# Patient Record
Sex: MALE | Race: WHITE | NOT HISPANIC OR LATINO | Employment: FULL TIME | URBAN - NONMETROPOLITAN AREA
[De-identification: names, ages, dates, MRNs, and addresses within clinical notes are randomized per-mention and may not be internally consistent; named-entity substitution may affect disease eponyms.]

---

## 2020-12-03 ENCOUNTER — OFFICE VISIT (OUTPATIENT)
Dept: ORTHOPEDIC SURGERY | Facility: CLINIC | Age: 20
End: 2020-12-03

## 2020-12-03 VITALS — WEIGHT: 116 LBS | RESPIRATION RATE: 16 BRPM | BODY MASS INDEX: 19.81 KG/M2 | HEIGHT: 64 IN

## 2020-12-03 DIAGNOSIS — M25.521 ARTHRALGIA OF ELBOW, RIGHT: Primary | ICD-10-CM

## 2020-12-03 DIAGNOSIS — M77.8 TRICEPS TENDONITIS: ICD-10-CM

## 2020-12-03 PROCEDURE — 99203 OFFICE O/P NEW LOW 30 MIN: CPT | Performed by: PHYSICIAN ASSISTANT

## 2020-12-03 NOTE — PROGRESS NOTES
Subjective   Patient ID: Navin Méndez is a 20 y.o. right hand dominant male  Initial Evaluation of the Right Elbow (Referred here by [facility] for right elbow pain. Was seen there 11/28/20 with imaging. Denies injury, became painful 1 week ago.)         History of Present Illness  Patient presents as a new patient at the request of urgent care for right elbow pain.  Patient denies having any injury or trauma.  He states on 11/28/2020 he developed significant right elbow pain.  There was no redness or warmth.  He did notice some swelling.  He denies numbness or tingling.  Denies shoulder pain. denies wrist pain  Patient went to the urgent care where he had x-rays.  He was told he may have posttraumatic injuries to the elbow and was provided Medrol Dosepak.  He states after taking the Medrol Dosepak he has had no pain.  He is feeling much better currently    Pain Score: 1  Pain Location: Elbow  Pain Orientation: Right     Pain Descriptors: Aching  Pain Frequency: Intermittent                               Past Medical History:   Diagnosis Date   • Hypoglycemia         Past Surgical History:   Procedure Laterality Date   • NO PAST SURGERIES         Family History   Problem Relation Age of Onset   • No Known Problems Mother    • No Known Problems Father        Social History     Socioeconomic History   • Marital status: Single     Spouse name: Not on file   • Number of children: Not on file   • Years of education: Not on file   • Highest education level: Not on file   Occupational History   • Occupation: stock     Employer: [employer]   Tobacco Use   • Smoking status: Never Smoker   • Smokeless tobacco: Never Used   Substance and Sexual Activity   • Alcohol use: Never     Frequency: Never   • Drug use: Never   • Sexual activity: Defer         Current Outpatient Medications:   •  methylPREDNISolone (MEDROL) 4 MG dose pack, Take as directed on package instructions., Disp: 1 each, Rfl:  "0    Allergies   Allergen Reactions   • Penicillins Unknown - Low Severity     ALL CILLINS       Review of Systems   Constitutional: Negative for diaphoresis, fever and unexpected weight change.   HENT: Negative for dental problem and sore throat.    Eyes: Negative for visual disturbance.   Respiratory: Negative for shortness of breath.    Cardiovascular: Negative for chest pain.   Gastrointestinal: Negative for abdominal pain, constipation, diarrhea, nausea and vomiting.   Genitourinary: Negative for difficulty urinating and frequency.   Musculoskeletal: Positive for arthralgias.   Neurological: Negative for headaches.   Hematological: Does not bruise/bleed easily.   All other systems reviewed and are negative.      I have reviewed the medical and surgical history, family history, social history, medications, and/or allergies, and the review of systems of this report.    Objective   Resp 16   Ht 162.6 cm (64\")   Wt 52.6 kg (116 lb)   BMI 19.91 kg/m²    Physical Exam  Vitals signs and nursing note reviewed.   Constitutional:       General: He is not in acute distress.     Appearance: Normal appearance.   Pulmonary:      Effort: Pulmonary effort is normal. No respiratory distress.   Musculoskeletal:      Right shoulder: He exhibits no deformity, no laceration and no pain.      Right elbow: He exhibits normal range of motion, no swelling, no effusion and no deformity. No tenderness found. No radial head, no medial epicondyle, no lateral epicondyle and no olecranon process tenderness noted.      Right wrist: He exhibits normal range of motion, no tenderness, no bony tenderness, no swelling, no effusion, no crepitus, no deformity and no laceration.      Right hand: He exhibits normal capillary refill and no swelling. Normal sensation noted.   Neurological:      General: No focal deficit present.      Mental Status: He is alert and oriented to person, place, and time.   Psychiatric:         Mood and Affect: Mood " normal.       Right Elbow Exam     Range of Motion   The patient has normal right elbow ROM.    Muscle Strength   The patient has normal right elbow strength.    Tests   Varus: negative  Valgus: negative  Tinel's sign (cubital tunnel): negative    Other   Erythema: absent  Sensation: normal  Pulse: present             Neurologic Exam     Mental Status   Oriented to person, place, and time.          There is no sulcus to the right triceps tendon    Assessment/Plan   Independent Review of Radiographic Studies:    FINAL REPORT     CLINICAL HISTORY:  Right elbow pain     FINDINGS:  2 views of the right elbow were obtained.  There is no acute  fracture or dislocation.  There is a linear bony density  adjacent to the olecranon suspect posttraumatic.  There is no  joint effusion.  The joint spaces are intact.  There is no acute  soft tissue abnormalities.     IMPRESSION:  Possible posttraumatic change without acute bony abnormality.     Authenticated by Whitney Wright MD on 11/28/2020 03:43:24 PM      We reviewed the x-ray results and imaging.  He denies having any past history of injury to the elbow    Procedures       Diagnoses and all orders for this visit:    1. Arthralgia of elbow, right (Primary)    2. Triceps tendonitis       Regular exercise as tolerated  Orthopedic activities reviewed and patient expressed appreciation  Discussion of orthopedic goals  Risk, benefits, and merits of treatment alternatives reviewed with the patient and questions answered    Recommendations/Plan:  Exercise, medications, injections, other patient advice, and return appointment as noted.  Patient is encouraged to call or return for any issues or concerns.    Because the patient is feeling better without pain.  His exam is within normal limits I do not feel an MRI or therapy is needed.  He will contact the office should any new symptoms or worsening symptoms occur    Patient agreeable to call or return sooner for any concerns.                RHINA Dragon-transcription disclaimer:  This encounter note is an electronic transcription of spoken language to printed text.  Electronic transcription of spoken language may permit erroneous or at times nonsensical words or phrases to be inadvertently transcribed.  Although I have reviewed the note for such errors, some may still exist

## 2021-11-22 ENCOUNTER — APPOINTMENT (OUTPATIENT)
Dept: GENERAL RADIOLOGY | Facility: HOSPITAL | Age: 21
End: 2021-11-22

## 2021-11-22 ENCOUNTER — HOSPITAL ENCOUNTER (OUTPATIENT)
Facility: HOSPITAL | Age: 21
Setting detail: OBSERVATION
Discharge: HOME | End: 2021-11-23
Attending: EMERGENCY MEDICINE | Admitting: STUDENT IN AN ORGANIZED HEALTH CARE EDUCATION/TRAINING PROGRAM

## 2021-11-22 DIAGNOSIS — I40.9 ACUTE MYOCARDITIS, UNSPECIFIED MYOCARDITIS TYPE: Primary | ICD-10-CM

## 2021-11-22 LAB
ALBUMIN SERPL-MCNC: 4.6 G/DL (ref 3.5–5.2)
ALBUMIN/GLOB SERPL: 1.4 G/DL
ALP SERPL-CCNC: 72 U/L (ref 39–117)
ALT SERPL W P-5'-P-CCNC: 17 U/L (ref 1–41)
ANION GAP SERPL CALCULATED.3IONS-SCNC: 10.5 MMOL/L (ref 5–15)
AST SERPL-CCNC: 36 U/L (ref 1–40)
BASOPHILS # BLD AUTO: 0.05 10*3/MM3 (ref 0–0.2)
BASOPHILS NFR BLD AUTO: 0.7 % (ref 0–1.5)
BILIRUB SERPL-MCNC: 0.4 MG/DL (ref 0–1.2)
BUN SERPL-MCNC: 12 MG/DL (ref 6–20)
BUN/CREAT SERPL: 14.1 (ref 7–25)
CALCIUM SPEC-SCNC: 9.3 MG/DL (ref 8.6–10.5)
CHLORIDE SERPL-SCNC: 100 MMOL/L (ref 98–107)
CO2 SERPL-SCNC: 24.5 MMOL/L (ref 22–29)
CREAT SERPL-MCNC: 0.85 MG/DL (ref 0.76–1.27)
DEPRECATED RDW RBC AUTO: 39.1 FL (ref 37–54)
EOSINOPHIL # BLD AUTO: 0.22 10*3/MM3 (ref 0–0.4)
EOSINOPHIL NFR BLD AUTO: 3 % (ref 0.3–6.2)
ERYTHROCYTE [DISTWIDTH] IN BLOOD BY AUTOMATED COUNT: 12.1 % (ref 12.3–15.4)
GFR SERPL CREATININE-BSD FRML MDRD: 112 ML/MIN/1.73
GLOBULIN UR ELPH-MCNC: 3.2 GM/DL
GLUCOSE SERPL-MCNC: 180 MG/DL (ref 65–99)
HCT VFR BLD AUTO: 43.5 % (ref 37.5–51)
HGB BLD-MCNC: 15 G/DL (ref 13–17.7)
HOLD SPECIMEN: NORMAL
HOLD SPECIMEN: NORMAL
IMM GRANULOCYTES # BLD AUTO: 0.02 10*3/MM3 (ref 0–0.05)
IMM GRANULOCYTES NFR BLD AUTO: 0.3 % (ref 0–0.5)
LYMPHOCYTES # BLD AUTO: 2.05 10*3/MM3 (ref 0.7–3.1)
LYMPHOCYTES NFR BLD AUTO: 27.9 % (ref 19.6–45.3)
MCH RBC QN AUTO: 30.4 PG (ref 26.6–33)
MCHC RBC AUTO-ENTMCNC: 34.5 G/DL (ref 31.5–35.7)
MCV RBC AUTO: 88.2 FL (ref 79–97)
MONOCYTES # BLD AUTO: 1.02 10*3/MM3 (ref 0.1–0.9)
MONOCYTES NFR BLD AUTO: 13.9 % (ref 5–12)
NEUTROPHILS NFR BLD AUTO: 4 10*3/MM3 (ref 1.7–7)
NEUTROPHILS NFR BLD AUTO: 54.2 % (ref 42.7–76)
NRBC BLD AUTO-RTO: 0 /100 WBC (ref 0–0.2)
PLATELET # BLD AUTO: 196 10*3/MM3 (ref 140–450)
PMV BLD AUTO: 9.6 FL (ref 6–12)
POTASSIUM SERPL-SCNC: 3.5 MMOL/L (ref 3.5–5.2)
PROT SERPL-MCNC: 7.8 G/DL (ref 6–8.5)
RBC # BLD AUTO: 4.93 10*6/MM3 (ref 4.14–5.8)
SODIUM SERPL-SCNC: 135 MMOL/L (ref 136–145)
TROPONIN T SERPL-MCNC: 0.44 NG/ML (ref 0–0.03)
WBC NRBC COR # BLD: 7.36 10*3/MM3 (ref 3.4–10.8)
WHOLE BLOOD HOLD SPECIMEN: NORMAL
WHOLE BLOOD HOLD SPECIMEN: NORMAL

## 2021-11-22 PROCEDURE — 25010000002 ENOXAPARIN PER 10 MG: Performed by: STUDENT IN AN ORGANIZED HEALTH CARE EDUCATION/TRAINING PROGRAM

## 2021-11-22 PROCEDURE — 71045 X-RAY EXAM CHEST 1 VIEW: CPT

## 2021-11-22 PROCEDURE — G0378 HOSPITAL OBSERVATION PER HR: HCPCS

## 2021-11-22 PROCEDURE — 96372 THER/PROPH/DIAG INJ SC/IM: CPT

## 2021-11-22 PROCEDURE — 25010000002 KETOROLAC TROMETHAMINE PER 15 MG: Performed by: EMERGENCY MEDICINE

## 2021-11-22 PROCEDURE — 96374 THER/PROPH/DIAG INJ IV PUSH: CPT

## 2021-11-22 PROCEDURE — 99284 EMERGENCY DEPT VISIT MOD MDM: CPT

## 2021-11-22 PROCEDURE — 36415 COLL VENOUS BLD VENIPUNCTURE: CPT

## 2021-11-22 PROCEDURE — 85025 COMPLETE CBC W/AUTO DIFF WBC: CPT | Performed by: EMERGENCY MEDICINE

## 2021-11-22 PROCEDURE — 84484 ASSAY OF TROPONIN QUANT: CPT | Performed by: EMERGENCY MEDICINE

## 2021-11-22 PROCEDURE — 93005 ELECTROCARDIOGRAM TRACING: CPT | Performed by: EMERGENCY MEDICINE

## 2021-11-22 PROCEDURE — 99220 PR INITIAL OBSERVATION CARE/DAY 70 MINUTES: CPT | Performed by: STUDENT IN AN ORGANIZED HEALTH CARE EDUCATION/TRAINING PROGRAM

## 2021-11-22 PROCEDURE — 80053 COMPREHEN METABOLIC PANEL: CPT | Performed by: EMERGENCY MEDICINE

## 2021-11-22 RX ORDER — KETOROLAC TROMETHAMINE 30 MG/ML
15 INJECTION, SOLUTION INTRAMUSCULAR; INTRAVENOUS ONCE
Status: COMPLETED | OUTPATIENT
Start: 2021-11-22 | End: 2021-11-22

## 2021-11-22 RX ORDER — ACETAMINOPHEN 325 MG/1
650 TABLET ORAL EVERY 6 HOURS PRN
COMMUNITY
End: 2021-11-23 | Stop reason: HOSPADM

## 2021-11-22 RX ORDER — ACETAMINOPHEN 650 MG/1
650 SUPPOSITORY RECTAL EVERY 4 HOURS PRN
Status: DISCONTINUED | OUTPATIENT
Start: 2021-11-22 | End: 2021-11-23 | Stop reason: HOSPADM

## 2021-11-22 RX ORDER — SODIUM CHLORIDE 0.9 % (FLUSH) 0.9 %
10 SYRINGE (ML) INJECTION AS NEEDED
Status: DISCONTINUED | OUTPATIENT
Start: 2021-11-22 | End: 2021-11-23 | Stop reason: HOSPADM

## 2021-11-22 RX ORDER — ACETAMINOPHEN 325 MG/1
650 TABLET ORAL EVERY 4 HOURS PRN
Status: DISCONTINUED | OUTPATIENT
Start: 2021-11-22 | End: 2021-11-23 | Stop reason: HOSPADM

## 2021-11-22 RX ORDER — PANTOPRAZOLE SODIUM 40 MG/1
40 TABLET, DELAYED RELEASE ORAL
Status: DISCONTINUED | OUTPATIENT
Start: 2021-11-23 | End: 2021-11-23 | Stop reason: HOSPADM

## 2021-11-22 RX ORDER — ONDANSETRON 2 MG/ML
4 INJECTION INTRAMUSCULAR; INTRAVENOUS EVERY 6 HOURS PRN
Status: DISCONTINUED | OUTPATIENT
Start: 2021-11-22 | End: 2021-11-23 | Stop reason: HOSPADM

## 2021-11-22 RX ORDER — COLCHICINE 0.6 MG/1
0.6 TABLET ORAL DAILY
Status: DISCONTINUED | OUTPATIENT
Start: 2021-11-22 | End: 2021-11-23 | Stop reason: HOSPADM

## 2021-11-22 RX ORDER — LORAZEPAM 0.5 MG/1
0.5 TABLET ORAL EVERY 8 HOURS PRN
Status: DISCONTINUED | OUTPATIENT
Start: 2021-11-22 | End: 2021-11-23 | Stop reason: HOSPADM

## 2021-11-22 RX ORDER — SODIUM CHLORIDE 0.9 % (FLUSH) 0.9 %
10 SYRINGE (ML) INJECTION EVERY 12 HOURS SCHEDULED
Status: DISCONTINUED | OUTPATIENT
Start: 2021-11-22 | End: 2021-11-23 | Stop reason: HOSPADM

## 2021-11-22 RX ORDER — ACETAMINOPHEN 160 MG/5ML
650 SOLUTION ORAL EVERY 4 HOURS PRN
Status: DISCONTINUED | OUTPATIENT
Start: 2021-11-22 | End: 2021-11-23 | Stop reason: HOSPADM

## 2021-11-22 RX ORDER — IBUPROFEN 800 MG/1
800 TABLET ORAL 3 TIMES DAILY
Status: DISCONTINUED | OUTPATIENT
Start: 2021-11-22 | End: 2021-11-23 | Stop reason: HOSPADM

## 2021-11-22 RX ORDER — ASPIRIN 325 MG
325 TABLET ORAL ONCE
Status: COMPLETED | OUTPATIENT
Start: 2021-11-22 | End: 2021-11-22

## 2021-11-22 RX ADMIN — IBUPROFEN 800 MG: 800 TABLET ORAL at 21:43

## 2021-11-22 RX ADMIN — IBUPROFEN 800 MG: 800 TABLET ORAL at 17:01

## 2021-11-22 RX ADMIN — ASPIRIN 487 MG: 81 TABLET, COATED ORAL at 21:43

## 2021-11-22 RX ADMIN — KETOROLAC TROMETHAMINE 15 MG: 30 INJECTION, SOLUTION INTRAMUSCULAR; INTRAVENOUS at 08:26

## 2021-11-22 RX ADMIN — SODIUM CHLORIDE, PRESERVATIVE FREE 10 ML: 5 INJECTION INTRAVENOUS at 21:43

## 2021-11-22 RX ADMIN — ASPIRIN 325 MG ORAL TABLET 325 MG: 325 PILL ORAL at 08:26

## 2021-11-22 RX ADMIN — ENOXAPARIN SODIUM 40 MG: 40 INJECTION SUBCUTANEOUS at 17:01

## 2021-11-22 RX ADMIN — COLCHICINE 0.6 MG: 0.6 TABLET, FILM COATED ORAL at 17:01

## 2021-11-22 NOTE — ED NOTES
Pt upstairs via wheel chair with orderly. Pt is aaox4 with out ss of distress. Pt has his belongings with him.      Goltz, Patrick, DIONNE  11/22/21 3931

## 2021-11-22 NOTE — H&P
[facility] HOSPITALIST   HISTORY AND PHYSICAL      Name:  Navin Méndez   Age:  21 y.o.  Sex:  male  :  2000  MRN:  [id number]   Visit Number:  [id number]  Admission Date:  2021  Date Of Service:  21  Primary Care Physician:  Provider, No Known    Chief Complaint:     Chest pain    History Of Presenting Illness:      Patient is a pleasant, 21-year-old male with past medical history of hypoglycemia and COVID-19 infection on 21 that presents to the hospital with complaints of chest pain. He explains that he received his first Moderna COVID vaccination on 21 and had some body aches and chills for about 24 hours, but then was in his normal state of health until last night around 11pm. He explains that beginning about 11:00 last night he started having an ache in the center of his chest, that radiated down his left arm. The left arm then began to have tingling sensations, and both resolved with the administration of 1,000mg Acetaminophen. Patient then went to sleep and awoke around 4:00 this morning with a stabbing, throbbing chest pain. He explains that this pain was made a little bit better after a second dosing of 1,000mg Acetaminophen. With continued chest pain and anxiety, the patient felt he should be seen in the ED for further evaluation.    Upon arrival to the ED, patient was found to have blood pressure of 135/102, troponin 0.438, EKG with twave inversions and nonspecific ST elevations. EKG and patient's case was reviewed with Dr. Root by the ED physician and he recommended echocardiogram and NSAID administration either inpatient or outpatient. Given patient's severe anxiety, hospitalist was called for admission for further workup.    Review Of Systems:    All systems were reviewed and negative except as mentioned in history of presenting illness, assessment and plan.    Past Medical History: Patient  has a past medical history of Hypoglycemia and Occasional  tremors.    Past Surgical History: Patient  has a past surgical history that includes No past surgeries.    Social History: Patient  reports that he has never smoked. He has never used smokeless tobacco. He reports current alcohol use. He reports that he does not use drugs.    Family History: Patient's family history includes No Known Problems in his father and mother.    Allergies:      Penicillins    Home Medications:    Prior to Admission Medications     Prescriptions Last Dose Informant Patient Reported? Taking?    albuterol sulfate  (90 Base) MCG/ACT inhaler   No No    Inhale 2 puffs Every 4 (Four) Hours As Needed for Wheezing.    predniSONE (DELTASONE) 20 MG tablet   No No    3 po daily for 3 days, 2 po daily for 3 days, 1 po daily for 3 days        ED Medications:    Medications   sodium chloride 0.9 % flush 10 mL (has no administration in time range)   aspirin tablet 325 mg (325 mg Oral Given 11/22/21 0826)   ketorolac (TORADOL) injection 15 mg (15 mg Intravenous Given 11/22/21 0826)     Vital Signs:  Temp:  [98.4 °F (36.9 °C)] 98.4 °F (36.9 °C)  Heart Rate:  [72-82] 73  Resp:  [20] 20  BP: (123-135)/() 123/91        11/22/21  0747   Weight: 51.7 kg (114 lb)     Body mass index is 19.57 kg/m².    Physical Exam:     General Appearance:  Alert and cooperative. Anxious.   Head:  Atraumatic and normocephalic.   Eyes: Conjunctivae and sclerae normal, no icterus. No pallor.       Throat: No oral lesions, no thrush, oral mucosa moist.   Neck: Supple, trachea midline, no thyromegaly.       Lungs:   Breath sounds heard bilaterally equally.  No crackles or wheezing. No Pleural rub or bronchial breathing.   Heart:  Normal S1 and S2, no murmur, no gallop, no rub. No JVD.   Abdomen:   Normal bowel sounds, no masses, no organomegaly. Soft, nontender, nondistended, no rebound tenderness.   Extremities: Supple, no edema, no cyanosis, no clubbing.   Pulses: Pulses palpable bilaterally.   Skin: No bleeding or  rash.   Neurologic: Alert and oriented x 3. No facial asymmetry. Moves all four limbs. Anxious, tremulous.      Laboratory data:    I have reviewed the labs done in the emergency room.    Results from last 7 days   Lab Units 11/22/21  0752   SODIUM mmol/L 135*   POTASSIUM mmol/L 3.5   CHLORIDE mmol/L 100   CO2 mmol/L 24.5   BUN mg/dL 12   CREATININE mg/dL 0.85   CALCIUM mg/dL 9.3   BILIRUBIN mg/dL 0.4   ALK PHOS U/L 72   ALT (SGPT) U/L 17   AST (SGOT) U/L 36   GLUCOSE mg/dL 180*     Results from last 7 days   Lab Units 11/22/21  0752   WBC 10*3/mm3 7.36   HEMOGLOBIN g/dL 15.0   HEMATOCRIT % 43.5   PLATELETS 10*3/mm3 196         Results from last 7 days   Lab Units 11/22/21  0752   TROPONIN T ng/mL 0.438*                           Invalid input(s): USDES,  BLOODU, NITRITITE, BACT, EP    Pain Management Panel    There is no flowsheet data to display.         EKG:      Sinus rhythm at a rate of 77 bpm, QTc 382, twave inversion in V1. Nonspecific ST elevation in lateral leads.    Radiology:    XR Chest 1 View    Result Date: 11/22/2021  PROCEDURE: XR CHEST 1 VW-  HISTORY: Chest Pain Triage Protocol  COMPARISON: None.  FINDINGS: The heart is normal in size. The mediastinum is unremarkable. The lungs are clear. There is no pneumothorax.  There are no acute osseous abnormalities.      No acute cardiopulmonary process.  Continued followup is recommended.  This report was finalized on 11/22/2021 8:16 AM by Ellie Lundberg M.D..      Assessment:    1.  Acute Pericarditis, POA  2.  Anxiety  3.  History of COVID-19 Infection, 8/20/21    Plan:    - Patient with acute pericarditis and subsequent chest pain and elevated troponin  - Will admit to telemetry on observation  - Received Aspirin 324mg in the ED as well as Ketoralac 15mg  - Will begin Aspirin 500mg tid, Ibuprofen 800mg tid, and Colchicine 0.6mg daily  - Begin PPI while taking high dose NSAID  - prn Ativan 0.5mg   - Will observe overnight and if does well, likely  discharge home in the AM    Risk Assessment: Moderate  DVT Prophylaxis: Lovenox  Code Status: Full  Diet: Cardiac    Advance Care Planning   ACP discussion was held with the patient during this visit. Patient does not have an advance directive, declines further assistance.      Neema Briggs DO  11/22/21  09:27 EST    Dictated utilizing Dragon dictation.

## 2021-11-22 NOTE — ED PROVIDER NOTES
Subjective   21-year-old male presenting with chest pain.  He states that last night started having a dull ache to his left chest, pain radiated to the left arm.  This morning woke up and pain was more severe and sharp.  There are no particular alleviating or aggravating factors.  He denies any nausea, vomiting, cough, shortness of breath.  He does note that 3 days ago he received his COVID-19 vaccine, first dose of Motrin.  He does not smoke.  No family history of early coronary disease or sudden cardiac death.  No other complaints or concerns this morning.          Review of Systems   Constitutional: Negative.    HENT: Negative.    Eyes: Negative.    Respiratory: Negative.    Cardiovascular: Positive for chest pain.   Gastrointestinal: Negative.    Genitourinary: Negative.    Musculoskeletal: Negative.    Skin: Negative.    Neurological: Negative.    Psychiatric/Behavioral: Negative.        Past Medical History:   Diagnosis Date   • Hypoglycemia    • Occasional tremors        Allergies   Allergen Reactions   • Penicillins Unknown - Low Severity     ALL CILLINS       Past Surgical History:   Procedure Laterality Date   • NO PAST SURGERIES         Family History   Problem Relation Age of Onset   • No Known Problems Mother    • No Known Problems Father        Social History     Socioeconomic History   • Marital status: Single   Tobacco Use   • Smoking status: Never Smoker   • Smokeless tobacco: Never Used   Vaping Use   • Vaping Use: Some days   • Substances: CBD   Substance and Sexual Activity   • Alcohol use: Yes     Comment: occ.   • Drug use: Never   • Sexual activity: Defer           Objective   Physical Exam  Vitals reviewed.   Constitutional:       General: He is not in acute distress.     Appearance: Normal appearance. He is not ill-appearing, toxic-appearing or diaphoretic.   HENT:      Head: Normocephalic and atraumatic.      Right Ear: External ear normal.      Left Ear: External ear normal.      Nose:  Nose normal.      Mouth/Throat:      Mouth: Mucous membranes are moist.      Pharynx: Oropharynx is clear.   Eyes:      Extraocular Movements: Extraocular movements intact.      Conjunctiva/sclera: Conjunctivae normal.      Pupils: Pupils are equal, round, and reactive to light.   Cardiovascular:      Rate and Rhythm: Normal rate and regular rhythm.      Pulses: Normal pulses.      Heart sounds: Normal heart sounds.   Pulmonary:      Effort: Pulmonary effort is normal. No respiratory distress.      Breath sounds: Normal breath sounds.   Abdominal:      General: Bowel sounds are normal. There is no distension.      Tenderness: There is no abdominal tenderness.   Musculoskeletal:         General: No swelling, tenderness or deformity. Normal range of motion.      Cervical back: Normal range of motion and neck supple.   Skin:     General: Skin is warm and dry.      Capillary Refill: Capillary refill takes less than 2 seconds.      Findings: No rash.   Neurological:      General: No focal deficit present.      Mental Status: He is alert and oriented to person, place, and time.   Psychiatric:         Mood and Affect: Mood normal.         Behavior: Behavior normal.         Procedures           ED Course                                           MDM  Number of Diagnoses or Management Options  Acute myocarditis, unspecified myocarditis type  Diagnosis management comments: 21-year-old male with chest pain.  Well-developed, well-nourished, nontoxic young man in no distress with exam as above.  His vital signs are normal.  His exam is otherwise nonfocal.  Will obtain labs, EKG and chest x-ray.  Will provide symptomatic treatment.  Disposition pending.    DDx: Anxiety, injection reaction, myocarditis, pericarditis    EKG interpreted by me: Sinus rhythm, normal rate, some non specific ST elevations and HI depressions, this is an abnormal EKG    Initial troponin is fairly elevated at 0.4.  Patient is resting comfortably.   Discussed case with Dr. Root, advised NSAIDs and echo, could be done as inpatient or outpatient.  After discussion with patient we decided inpatient would be the best route.  Discussed with Dr. Friedman who graciously accepts patient for admission.      Final diagnoses:   Acute myocarditis, unspecified myocarditis type          Lobo Weems MD  11/22/21 0925

## 2021-11-23 ENCOUNTER — APPOINTMENT (OUTPATIENT)
Dept: CARDIOLOGY | Facility: HOSPITAL | Age: 21
End: 2021-11-23

## 2021-11-23 VITALS
RESPIRATION RATE: 17 BRPM | OXYGEN SATURATION: 99 % | SYSTOLIC BLOOD PRESSURE: 117 MMHG | TEMPERATURE: 97.3 F | DIASTOLIC BLOOD PRESSURE: 72 MMHG | BODY MASS INDEX: 19.01 KG/M2 | WEIGHT: 111.33 LBS | HEART RATE: 77 BPM | HEIGHT: 64 IN

## 2021-11-23 LAB
ANION GAP SERPL CALCULATED.3IONS-SCNC: 8.1 MMOL/L (ref 5–15)
BASOPHILS # BLD AUTO: 0.04 10*3/MM3 (ref 0–0.2)
BASOPHILS NFR BLD AUTO: 0.6 % (ref 0–1.5)
BH CV ECHO MEAS - % IVS THICK: 27.8 %
BH CV ECHO MEAS - % LVPW THICK: 40.8 %
BH CV ECHO MEAS - AO MAX PG (FULL): 0.47 MMHG
BH CV ECHO MEAS - AO MAX PG: 3 MMHG
BH CV ECHO MEAS - AO MEAN PG (FULL): 1 MMHG
BH CV ECHO MEAS - AO MEAN PG: 2 MMHG
BH CV ECHO MEAS - AO V2 MAX: 92.8 CM/SEC
BH CV ECHO MEAS - AO V2 MEAN: 68.9 CM/SEC
BH CV ECHO MEAS - AO V2 VTI: 17.4 CM
BH CV ECHO MEAS - AVA(I,A): 3.1 CM^2
BH CV ECHO MEAS - AVA(I,D): 3.1 CM^2
BH CV ECHO MEAS - AVA(V,A): 3 CM^2
BH CV ECHO MEAS - AVA(V,D): 3 CM^2
BH CV ECHO MEAS - BSA(HAYCOCK): 1.5 M^2
BH CV ECHO MEAS - BSA: 1.5 M^2
BH CV ECHO MEAS - BZI_BMI: 19.1 KILOGRAMS/M^2
BH CV ECHO MEAS - BZI_METRIC_HEIGHT: 162.6 CM
BH CV ECHO MEAS - BZI_METRIC_WEIGHT: 50.3 KG
BH CV ECHO MEAS - EDV(CUBED): 94.8 ML
BH CV ECHO MEAS - EDV(MOD-SP2): 59.6 ML
BH CV ECHO MEAS - EDV(MOD-SP4): 71.7 ML
BH CV ECHO MEAS - EDV(TEICH): 95.4 ML
BH CV ECHO MEAS - EF(CUBED): 68.3 %
BH CV ECHO MEAS - EF(MOD-BP): 61.2 %
BH CV ECHO MEAS - EF(MOD-SP2): 61.1 %
BH CV ECHO MEAS - EF(MOD-SP4): 60.1 %
BH CV ECHO MEAS - EF(TEICH): 59.9 %
BH CV ECHO MEAS - ESV(CUBED): 30.1 ML
BH CV ECHO MEAS - ESV(MOD-SP2): 23.2 ML
BH CV ECHO MEAS - ESV(MOD-SP4): 28.6 ML
BH CV ECHO MEAS - ESV(TEICH): 38.2 ML
BH CV ECHO MEAS - FS: 31.8 %
BH CV ECHO MEAS - IVS/LVPW: 0.81
BH CV ECHO MEAS - IVSD: 0.79 CM
BH CV ECHO MEAS - IVSS: 1 CM
BH CV ECHO MEAS - LA DIMENSION: 3 CM
BH CV ECHO MEAS - LAD MAJOR: 4.1 CM
BH CV ECHO MEAS - LAT PEAK E' VEL: 14.6 CM/SEC
BH CV ECHO MEAS - LATERAL E/E' RATIO: 4.2
BH CV ECHO MEAS - LV DIASTOLIC VOL/BSA (35-75): 47.1 ML/M^2
BH CV ECHO MEAS - LV MASS(C)D: 132.7 GRAMS
BH CV ECHO MEAS - LV MASS(C)DI: 87.2 GRAMS/M^2
BH CV ECHO MEAS - LV MASS(C)S: 114 GRAMS
BH CV ECHO MEAS - LV MASS(C)SI: 74.9 GRAMS/M^2
BH CV ECHO MEAS - LV MAX PG: 2.5 MMHG
BH CV ECHO MEAS - LV MEAN PG: 1 MMHG
BH CV ECHO MEAS - LV SYSTOLIC VOL/BSA (12-30): 18.8 ML/M^2
BH CV ECHO MEAS - LV V1 MAX: 79.5 CM/SEC
BH CV ECHO MEAS - LV V1 MEAN: 50.6 CM/SEC
BH CV ECHO MEAS - LV V1 VTI: 15.5 CM
BH CV ECHO MEAS - LVIDD: 4.6 CM
BH CV ECHO MEAS - LVIDS: 3.1 CM
BH CV ECHO MEAS - LVLD AP2: 6.4 CM
BH CV ECHO MEAS - LVLD AP4: 6.9 CM
BH CV ECHO MEAS - LVLS AP2: 4.9 CM
BH CV ECHO MEAS - LVLS AP4: 5.2 CM
BH CV ECHO MEAS - LVOT AREA (M): 3.5 CM^2
BH CV ECHO MEAS - LVOT AREA: 3.5 CM^2
BH CV ECHO MEAS - LVOT DIAM: 2.1 CM
BH CV ECHO MEAS - LVPWD: 0.98 CM
BH CV ECHO MEAS - LVPWS: 1.4 CM
BH CV ECHO MEAS - MED PEAK E' VEL: 11.6 CM/SEC
BH CV ECHO MEAS - MEDIAL E/E' RATIO: 5.3
BH CV ECHO MEAS - MV A MAX VEL: 56 CM/SEC
BH CV ECHO MEAS - MV DEC TIME: 0.16 SEC
BH CV ECHO MEAS - MV E MAX VEL: 61.2 CM/SEC
BH CV ECHO MEAS - MV E/A: 1.1
BH CV ECHO MEAS - MV MAX PG: 2.1 MMHG
BH CV ECHO MEAS - MV MEAN PG: 1 MMHG
BH CV ECHO MEAS - MV V2 MAX: 73.2 CM/SEC
BH CV ECHO MEAS - MV V2 MEAN: 45.9 CM/SEC
BH CV ECHO MEAS - MV V2 VTI: 14 CM
BH CV ECHO MEAS - MVA(VTI): 3.9 CM^2
BH CV ECHO MEAS - PA ACC TIME: 0.15 SEC
BH CV ECHO MEAS - PA MAX PG (FULL): 3.4 MMHG
BH CV ECHO MEAS - PA MAX PG: 5.4 MMHG
BH CV ECHO MEAS - PA MEAN PG (FULL): 2 MMHG
BH CV ECHO MEAS - PA MEAN PG: 3 MMHG
BH CV ECHO MEAS - PA PR(ACCEL): 12.4 MMHG
BH CV ECHO MEAS - PA V2 MAX: 116 CM/SEC
BH CV ECHO MEAS - PA V2 MEAN: 77.6 CM/SEC
BH CV ECHO MEAS - PA V2 VTI: 22.8 CM
BH CV ECHO MEAS - PI END-D VEL: 80.1 CM/SEC
BH CV ECHO MEAS - RAP SYSTOLE: 3 MMHG
BH CV ECHO MEAS - RV MAX PG: 2 MMHG
BH CV ECHO MEAS - RV MEAN PG: 1 MMHG
BH CV ECHO MEAS - RV V1 MAX: 70.9 CM/SEC
BH CV ECHO MEAS - RV V1 MEAN: 44.3 CM/SEC
BH CV ECHO MEAS - RV V1 VTI: 13.2 CM
BH CV ECHO MEAS - RVSP: 13 MMHG
BH CV ECHO MEAS - SI(CUBED): 42.5 ML/M^2
BH CV ECHO MEAS - SI(LVOT): 35.9 ML/M^2
BH CV ECHO MEAS - SI(MOD-SP2): 23.9 ML/M^2
BH CV ECHO MEAS - SI(MOD-SP4): 28.3 ML/M^2
BH CV ECHO MEAS - SI(TEICH): 37.5 ML/M^2
BH CV ECHO MEAS - SV(CUBED): 64.7 ML
BH CV ECHO MEAS - SV(LVOT): 54.7 ML
BH CV ECHO MEAS - SV(MOD-SP2): 36.4 ML
BH CV ECHO MEAS - SV(MOD-SP4): 43.1 ML
BH CV ECHO MEAS - SV(TEICH): 57.1 ML
BH CV ECHO MEAS - TAPSE (>1.6): 2.5 CM
BH CV ECHO MEAS - TR MAX PG: 10 MMHG
BH CV ECHO MEAS - TR MAX VEL: 155 CM/SEC
BH CV ECHO MEASUREMENTS AVERAGE E/E' RATIO: 4.67
BH CV XLRA - TDI S': 13.2 CM/SEC
BUN SERPL-MCNC: 18 MG/DL (ref 6–20)
BUN/CREAT SERPL: 20.2 (ref 7–25)
CALCIUM SPEC-SCNC: 9.4 MG/DL (ref 8.6–10.5)
CHLORIDE SERPL-SCNC: 104 MMOL/L (ref 98–107)
CO2 SERPL-SCNC: 27.9 MMOL/L (ref 22–29)
CREAT SERPL-MCNC: 0.89 MG/DL (ref 0.76–1.27)
DEPRECATED RDW RBC AUTO: 40 FL (ref 37–54)
EOSINOPHIL # BLD AUTO: 0.27 10*3/MM3 (ref 0–0.4)
EOSINOPHIL NFR BLD AUTO: 4.3 % (ref 0.3–6.2)
ERYTHROCYTE [DISTWIDTH] IN BLOOD BY AUTOMATED COUNT: 12.3 % (ref 12.3–15.4)
GFR SERPL CREATININE-BSD FRML MDRD: 106 ML/MIN/1.73
GLUCOSE SERPL-MCNC: 163 MG/DL (ref 65–99)
HCT VFR BLD AUTO: 41.6 % (ref 37.5–51)
HGB BLD-MCNC: 14.2 G/DL (ref 13–17.7)
IMM GRANULOCYTES # BLD AUTO: 0.01 10*3/MM3 (ref 0–0.05)
IMM GRANULOCYTES NFR BLD AUTO: 0.2 % (ref 0–0.5)
LEFT ATRIUM VOLUME INDEX: 16 ML/M^2
LEFT ATRIUM VOLUME: 24.3 ML
LV EF 2D ECHO EST: 65 %
LYMPHOCYTES # BLD AUTO: 1.78 10*3/MM3 (ref 0.7–3.1)
LYMPHOCYTES NFR BLD AUTO: 28.1 % (ref 19.6–45.3)
MAXIMAL PREDICTED HEART RATE: 197 BPM
MCH RBC QN AUTO: 30.4 PG (ref 26.6–33)
MCHC RBC AUTO-ENTMCNC: 34.1 G/DL (ref 31.5–35.7)
MCV RBC AUTO: 89.1 FL (ref 79–97)
MONOCYTES # BLD AUTO: 0.63 10*3/MM3 (ref 0.1–0.9)
MONOCYTES NFR BLD AUTO: 9.9 % (ref 5–12)
NEUTROPHILS NFR BLD AUTO: 3.61 10*3/MM3 (ref 1.7–7)
NEUTROPHILS NFR BLD AUTO: 56.9 % (ref 42.7–76)
NRBC BLD AUTO-RTO: 0 /100 WBC (ref 0–0.2)
PLATELET # BLD AUTO: 212 10*3/MM3 (ref 140–450)
PMV BLD AUTO: 10.2 FL (ref 6–12)
POTASSIUM SERPL-SCNC: 4 MMOL/L (ref 3.5–5.2)
RBC # BLD AUTO: 4.67 10*6/MM3 (ref 4.14–5.8)
SODIUM SERPL-SCNC: 140 MMOL/L (ref 136–145)
STRESS TARGET HR: 167 BPM
WBC NRBC COR # BLD: 6.34 10*3/MM3 (ref 3.4–10.8)

## 2021-11-23 PROCEDURE — 99217 PR OBSERVATION CARE DISCHARGE MANAGEMENT: CPT | Performed by: STUDENT IN AN ORGANIZED HEALTH CARE EDUCATION/TRAINING PROGRAM

## 2021-11-23 PROCEDURE — G0378 HOSPITAL OBSERVATION PER HR: HCPCS

## 2021-11-23 PROCEDURE — 80048 BASIC METABOLIC PNL TOTAL CA: CPT | Performed by: STUDENT IN AN ORGANIZED HEALTH CARE EDUCATION/TRAINING PROGRAM

## 2021-11-23 PROCEDURE — 93306 TTE W/DOPPLER COMPLETE: CPT | Performed by: INTERNAL MEDICINE

## 2021-11-23 PROCEDURE — 85025 COMPLETE CBC W/AUTO DIFF WBC: CPT | Performed by: STUDENT IN AN ORGANIZED HEALTH CARE EDUCATION/TRAINING PROGRAM

## 2021-11-23 PROCEDURE — 93306 TTE W/DOPPLER COMPLETE: CPT

## 2021-11-23 RX ORDER — COLCHICINE 0.6 MG/1
0.6 TABLET ORAL DAILY
Qty: 6 TABLET | Refills: 0 | Status: SHIPPED | OUTPATIENT
Start: 2021-11-24 | End: 2021-12-02

## 2021-11-23 RX ORDER — PANTOPRAZOLE SODIUM 40 MG/1
40 TABLET, DELAYED RELEASE ORAL
Qty: 14 TABLET | Refills: 0 | Status: SHIPPED | OUTPATIENT
Start: 2021-11-24 | End: 2022-06-21

## 2021-11-23 RX ORDER — IBUPROFEN 800 MG/1
800 TABLET ORAL 3 TIMES DAILY
Qty: 18 TABLET | Refills: 0 | Status: SHIPPED | OUTPATIENT
Start: 2021-11-23 | End: 2021-12-02

## 2021-11-23 RX ADMIN — ASPIRIN 487 MG: 81 TABLET, COATED ORAL at 08:42

## 2021-11-23 RX ADMIN — COLCHICINE 0.6 MG: 0.6 TABLET, FILM COATED ORAL at 08:43

## 2021-11-23 RX ADMIN — IBUPROFEN 800 MG: 800 TABLET ORAL at 08:43

## 2021-11-23 RX ADMIN — SODIUM CHLORIDE, PRESERVATIVE FREE 10 ML: 5 INJECTION INTRAVENOUS at 08:43

## 2021-11-23 RX ADMIN — PANTOPRAZOLE SODIUM 40 MG: 40 TABLET, DELAYED RELEASE ORAL at 05:55

## 2021-11-23 NOTE — CASE MANAGEMENT/SOCIAL WORK
Continued Stay Note   [facility]     Patient Name: Navin Méndez  MRN: [id number]  Today's Date: 11/23/2021    Admit Date: 11/22/2021     Discharge Plan     Row Name 11/23/21 1008       Plan    Plan Spoke to pt regarding discharge plans .Confirmed address and phone number He does not have insurance sent E mail to [facility] med assist He does not have a Primary He has agreed to [facility] since he does not have insurance .He also has agreed to [facility] for his medication .He lives with his father and his father will transport home               Discharge Codes    No documentation.                     Kiki Alford RN

## 2021-11-23 NOTE — DISCHARGE SUMMARY
[facility]      Name:  Navin Méndez   Age:  21 y.o.  Sex:  male  :  2000  MRN:  [id number]   Visit Number:  [id number]    Admission Date:  2021  Date of Discharge:  2021  Primary Care Physician:  Provider, No Known    Important issues to note:    - Take Colchicine daily and Ibuprofen three times daily for 1 week  - If symptoms persist or worsen, please see your PCP or return to the hospital    Discharge Diagnoses:     1.  Acute Pericarditis, POA  2.  Anxiety  3.  History of COVID-19 Infection, 21    Problem List:     Active Hospital Problems    Diagnosis  POA   • Acute myocarditis [I40.9]  Yes      Resolved Hospital Problems   No resolved problems to display.     Presenting Problem:    Chief Complaint   Patient presents with   • Chest Pain      Consults:     Consulting Physician(s)             None          Procedures Performed:    Echocardiogram    History of presenting illness/Hospital Course:    Patient is a pleasant, 21-year-old male with past medical history of hypoglycemia and COVID-19 infection on 21 that presents to the hospital with complaints of chest pain. He explains that he received his first Moderna COVID vaccination on 21 and had some body aches and chills for about 24 hours, but then was in his normal state of health until last night around 11pm. He explains that beginning about 11:00 last night he started having an ache in the center of his chest, that radiated down his left arm. The left arm then began to have tingling sensations, and both resolved with the administration of 1,000mg Acetaminophen. Patient then went to sleep and awoke around 4:00 this morning with a stabbing, throbbing chest pain. He explains that this pain was made a little bit better after a second dosing of 1,000mg Acetaminophen. With continued chest pain and anxiety, the patient felt he should be seen in the ED for further  evaluation.     Upon arrival to the ED, patient was found to have blood pressure of 135/102, troponin 0.438, EKG with twave inversions and nonspecific ST elevations. EKG and patient's case was reviewed with Dr. Root by the ED physician and he recommended echocardiogram and NSAID administration either inpatient or outpatient. Given patient's severe anxiety, hospitalist was called for admission for further workup.    With administration of NSAIDs and colchicine, patient had complete resolution of symptoms. Echocardiogram was performed with official report pending at time of discharge. Patient discharged home with prescription for NSAID and Colchicine, as well as PPI for gastric protection while taking NSAID. He has been instructed to follow up with his PCP in 1 week and if he does not have resolution of symptoms or worsening of symptoms, then to return to the hospital for evaluation.    Vital Signs:    Temp:  [97.3 °F (36.3 °C)-98.3 °F (36.8 °C)] 97.3 °F (36.3 °C)  Heart Rate:  [56-83] 77  Resp:  [17-20] 17  BP: (100-119)/(66-93) 117/72    Physical Exam:    General Appearance:  Alert and cooperative. In NAD.   Head:  Atraumatic and normocephalic.   Eyes: Conjunctivae and sclerae normal, no icterus. No pallor.       Throat: No oral lesions, no thrush, oral mucosa moist.   Neck: Supple, trachea midline, no thyromegaly.       Lungs:   Breath sounds heard bilaterally equally.  No crackles or wheezing. No Pleural rub or bronchial breathing.   Heart:  Normal S1 and S2, no murmur, no gallop, no rub. No JVD.   Abdomen:   Normal bowel sounds, no masses, no organomegaly. Soft, nontender, nondistended, no rebound tenderness.   Extremities: Supple, no edema, no cyanosis, no clubbing.   Pulses: Pulses palpable bilaterally.   Skin: No bleeding or rash.   Neurologic: Alert and oriented x 3. No facial asymmetry. Moves all four limbs. No tremors.     Pertinent Lab Results:     Results from last 7 days   Lab Units 11/23/21  0657  11/22/21  0752   SODIUM mmol/L 140 135*   POTASSIUM mmol/L 4.0 3.5   CHLORIDE mmol/L 104 100   CO2 mmol/L 27.9 24.5   BUN mg/dL 18 12   CREATININE mg/dL 0.89 0.85   CALCIUM mg/dL 9.4 9.3   BILIRUBIN mg/dL  --  0.4   ALK PHOS U/L  --  72   ALT (SGPT) U/L  --  17   AST (SGOT) U/L  --  36   GLUCOSE mg/dL 163* 180*     Results from last 7 days   Lab Units 11/23/21  0657 11/22/21 0752   WBC 10*3/mm3 6.34 7.36   HEMOGLOBIN g/dL 14.2 15.0   HEMATOCRIT % 41.6 43.5   PLATELETS 10*3/mm3 212 196         Results from last 7 days   Lab Units 11/22/21 0752   TROPONIN T ng/mL 0.438*                           Pertinent Radiology Results:    Imaging Results (All)     Procedure Component Value Units Date/Time    XR Chest 1 View [[id number]] Collected: 11/22/21 0816     Updated: 11/22/21 0818    Narrative:      PROCEDURE: XR CHEST 1 VW-     HISTORY: Chest Pain Triage Protocol     COMPARISON: None.     FINDINGS: The heart is normal in size. The mediastinum is unremarkable.  The lungs are clear. There is no pneumothorax.  There are no acute  osseous abnormalities.       Impression:      No acute cardiopulmonary process.     Continued followup is recommended.     This report was finalized on 11/22/2021 8:16 AM by Ellie Lundberg M.D..          Echo:    Final report pending    Condition on Discharge:      Stable.    Code status during the hospital stay:    Code Status and Medical Interventions:   Ordered at: 11/22/21 0919     Code Status (Patient has no pulse and is not breathing):    CPR (Attempt to Resuscitate)     Medical Interventions (Patient has pulse or is breathing):    Full Support     Discharge Disposition:    Home or Self Care    Discharge Medications:       Discharge Medications      New Medications      Instructions Start Date   colchicine 0.6 MG tablet   0.6 mg, Oral, Daily   Start Date: November 24, 2021     ibuprofen 800 MG tablet  Commonly known as: ADVIL,MOTRIN   800 mg, Oral, 3 Times Daily      pantoprazole 40 MG  EC tablet  Commonly known as: PROTONIX   40 mg, Oral, Every Early Morning   Start Date: November 24, 2021        Stop These Medications    acetaminophen 325 MG tablet  Commonly known as: TYLENOL          Discharge Diet:     As tolerated    Activity at Discharge:     As tolerated    Follow-up Appointments:     Follow-up Information     [facility] Follow up.    Why: new patient see above  Contact information:  [address] [place] [address]  [phone]           Tara Jeronimo APRN Follow up.    Specialty: Family Medicine  Why: @11:30am 30 mins early med bottles with him will assist with sliding scale  Contact information:  [address] [place] [address]  [phone]             Provider, No Known .    Contact information:  [facility] [address]                       No future appointments.  Test Results Pending at Discharge:           Neema Briggs DO  11/23/21  11:16 EST    Time: I spent 35 minutes on this discharge activity which included: face-to-face encounter with the patient, reviewing the data in the system, coordination of the care with the nursing staff as well as consultants, documentation, and entering orders.     Dictated utilizing Dragon dictation.

## 2021-11-23 NOTE — CASE MANAGEMENT/SOCIAL WORK
Case Management Discharge Note           Provided Post Acute Provider List?: N/A  Provided Post Acute Provider Quality & Resource List?: N/A    Selected Continued Care - Admitted Since 11/22/2021     Destination    No services have been selected for the patient.              Durable Medical Equipment    No services have been selected for the patient.              Dialysis/Infusion    No services have been selected for the patient.              Home Medical Care    No services have been selected for the patient.              Therapy    No services have been selected for the patient.              Community Resources    No services have been selected for the patient.              Community & DME    No services have been selected for the patient.                  Transportation Services  Private: Car    Final Discharge Disposition Code: 01 - home or self-care

## 2022-06-21 PROCEDURE — U0004 COV-19 TEST NON-CDC HGH THRU: HCPCS | Performed by: NURSE PRACTITIONER

## 2023-01-13 ENCOUNTER — TELEPHONE (OUTPATIENT)
Dept: URGENT CARE | Facility: CLINIC | Age: 23
End: 2023-01-13
Payer: COMMERCIAL

## 2023-01-13 NOTE — TELEPHONE ENCOUNTER
PT RTC (NOT TO BE SEEN) TO LET US KNOW HE BELIEVES HE HAD ALLGERGIC REACTION TO ZPAK GIVEN ON 1/5/22? HE STATE THE TOPS OF HIS HANDS BROKE OUT IN PAINFUL RASH. HE STATES HE DID COMPLETE THE RX. HE JUST WANTED TO LET US KNOW

## 2023-02-25 ENCOUNTER — HOSPITAL ENCOUNTER (EMERGENCY)
Facility: HOSPITAL | Age: 23
Discharge: HOME | End: 2023-02-26
Attending: EMERGENCY MEDICINE | Admitting: EMERGENCY MEDICINE
Payer: COMMERCIAL

## 2023-02-25 DIAGNOSIS — R51.9 ACUTE NONINTRACTABLE HEADACHE, UNSPECIFIED HEADACHE TYPE: ICD-10-CM

## 2023-02-25 DIAGNOSIS — R42 DIZZINESS: Primary | ICD-10-CM

## 2023-02-25 DIAGNOSIS — R73.9 HYPERGLYCEMIA: ICD-10-CM

## 2023-02-25 PROCEDURE — 99283 EMERGENCY DEPT VISIT LOW MDM: CPT

## 2023-02-26 ENCOUNTER — APPOINTMENT (OUTPATIENT)
Dept: CT IMAGING | Facility: HOSPITAL | Age: 23
End: 2023-02-26
Payer: COMMERCIAL

## 2023-02-26 VITALS
RESPIRATION RATE: 18 BRPM | TEMPERATURE: 98.1 F | HEART RATE: 75 BPM | HEIGHT: 64 IN | DIASTOLIC BLOOD PRESSURE: 75 MMHG | SYSTOLIC BLOOD PRESSURE: 116 MMHG | OXYGEN SATURATION: 98 % | WEIGHT: 107 LBS | BODY MASS INDEX: 18.27 KG/M2

## 2023-02-26 LAB
ALBUMIN SERPL-MCNC: 4.4 G/DL (ref 3.5–5.2)
ALBUMIN/GLOB SERPL: 1.9 G/DL
ALP SERPL-CCNC: 83 U/L (ref 39–117)
ALT SERPL W P-5'-P-CCNC: 23 U/L (ref 1–41)
ANION GAP SERPL CALCULATED.3IONS-SCNC: 11.3 MMOL/L (ref 5–15)
ARTERIAL PATENCY WRIST A: ABNORMAL
AST SERPL-CCNC: 21 U/L (ref 1–40)
ATMOSPHERIC PRESS: 737 MMHG
BASE EXCESS BLDA CALC-SCNC: 0.7 MMOL/L (ref 0–2)
BASOPHILS # BLD AUTO: 0.07 10*3/MM3 (ref 0–0.2)
BASOPHILS NFR BLD AUTO: 0.7 % (ref 0–1.5)
BDY SITE: ABNORMAL
BILIRUB SERPL-MCNC: 0.4 MG/DL (ref 0–1.2)
BILIRUB UR QL STRIP: NEGATIVE
BUN SERPL-MCNC: 36 MG/DL (ref 6–20)
BUN/CREAT SERPL: 27.7 (ref 7–25)
CALCIUM SPEC-SCNC: 8.8 MG/DL (ref 8.6–10.5)
CHLORIDE SERPL-SCNC: 99 MMOL/L (ref 98–107)
CLARITY UR: CLEAR
CO2 SERPL-SCNC: 26.7 MMOL/L (ref 22–29)
COHGB MFR BLD: 1.1 % (ref 0–2)
COLOR UR: YELLOW
CREAT SERPL-MCNC: 1.3 MG/DL (ref 0.76–1.27)
DEPRECATED RDW RBC AUTO: 37.4 FL (ref 37–54)
EGFRCR SERPLBLD CKD-EPI 2021: 78.7 ML/MIN/1.73
EOSINOPHIL # BLD AUTO: 0.22 10*3/MM3 (ref 0–0.4)
EOSINOPHIL NFR BLD AUTO: 2.2 % (ref 0.3–6.2)
ERYTHROCYTE [DISTWIDTH] IN BLOOD BY AUTOMATED COUNT: 12.1 % (ref 12.3–15.4)
GLOBULIN UR ELPH-MCNC: 2.3 GM/DL
GLUCOSE BLDC GLUCOMTR-MCNC: 173 MG/DL (ref 70–130)
GLUCOSE BLDC GLUCOMTR-MCNC: 227 MG/DL (ref 70–130)
GLUCOSE SERPL-MCNC: 227 MG/DL (ref 65–99)
GLUCOSE UR STRIP-MCNC: ABNORMAL MG/DL
HCO3 BLDA-SCNC: 25.8 MMOL/L (ref 22–28)
HCT VFR BLD AUTO: 41.6 % (ref 37.5–51)
HCT VFR BLD CALC: 41.6 %
HGB BLD-MCNC: 14.6 G/DL (ref 13–17.7)
HGB UR QL STRIP.AUTO: NEGATIVE
HOLD SPECIMEN: NORMAL
HOLD SPECIMEN: NORMAL
IMM GRANULOCYTES # BLD AUTO: 0.03 10*3/MM3 (ref 0–0.05)
IMM GRANULOCYTES NFR BLD AUTO: 0.3 % (ref 0–0.5)
KETONES UR QL STRIP: ABNORMAL
LEUKOCYTE ESTERASE UR QL STRIP.AUTO: NEGATIVE
LYMPHOCYTES # BLD AUTO: 2.73 10*3/MM3 (ref 0.7–3.1)
LYMPHOCYTES NFR BLD AUTO: 26.9 % (ref 19.6–45.3)
Lab: ABNORMAL
MCH RBC QN AUTO: 29.9 PG (ref 26.6–33)
MCHC RBC AUTO-ENTMCNC: 35.1 G/DL (ref 31.5–35.7)
MCV RBC AUTO: 85.2 FL (ref 79–97)
METHGB BLD QL: 0.4 % (ref 0–1.5)
MODALITY: ABNORMAL
MONOCYTES # BLD AUTO: 0.83 10*3/MM3 (ref 0.1–0.9)
MONOCYTES NFR BLD AUTO: 8.2 % (ref 5–12)
NEUTROPHILS NFR BLD AUTO: 6.28 10*3/MM3 (ref 1.7–7)
NEUTROPHILS NFR BLD AUTO: 61.7 % (ref 42.7–76)
NITRITE UR QL STRIP: NEGATIVE
NOTE: ABNORMAL
NRBC BLD AUTO-RTO: 0 /100 WBC (ref 0–0.2)
OXYHGB MFR BLDV: 81.2 % (ref 94–99)
PCO2 BLDA: 41.9 MM HG (ref 35–45)
PCO2 TEMP ADJ BLD: ABNORMAL MM[HG]
PH BLDA: 7.4 PH UNITS (ref 7.35–7.45)
PH UR STRIP.AUTO: 6.5 [PH] (ref 5–8)
PH, TEMP CORRECTED: ABNORMAL
PLATELET # BLD AUTO: 264 10*3/MM3 (ref 140–450)
PMV BLD AUTO: 10.7 FL (ref 6–12)
PO2 BLDA: 44.4 MM HG (ref 75–100)
PO2 TEMP ADJ BLD: ABNORMAL MM[HG]
POTASSIUM SERPL-SCNC: 4 MMOL/L (ref 3.5–5.2)
PROT SERPL-MCNC: 6.7 G/DL (ref 6–8.5)
PROT UR QL STRIP: NEGATIVE
RBC # BLD AUTO: 4.88 10*6/MM3 (ref 4.14–5.8)
SAO2 % BLDCOA: 82.4 % (ref 94–100)
SODIUM SERPL-SCNC: 137 MMOL/L (ref 136–145)
SP GR UR STRIP: >=1.03 (ref 1–1.03)
UROBILINOGEN UR QL STRIP: ABNORMAL
VENTILATOR MODE: ABNORMAL
WBC NRBC COR # BLD: 10.16 10*3/MM3 (ref 3.4–10.8)
WHOLE BLOOD HOLD COAG: NORMAL
WHOLE BLOOD HOLD SPECIMEN: NORMAL

## 2023-02-26 PROCEDURE — 25010000002 DIPHENHYDRAMINE PER 50 MG: Performed by: EMERGENCY MEDICINE

## 2023-02-26 PROCEDURE — 80053 COMPREHEN METABOLIC PANEL: CPT | Performed by: EMERGENCY MEDICINE

## 2023-02-26 PROCEDURE — 96376 TX/PRO/DX INJ SAME DRUG ADON: CPT

## 2023-02-26 PROCEDURE — 93005 ELECTROCARDIOGRAM TRACING: CPT | Performed by: EMERGENCY MEDICINE

## 2023-02-26 PROCEDURE — 82375 ASSAY CARBOXYHB QUANT: CPT

## 2023-02-26 PROCEDURE — 83050 HGB METHEMOGLOBIN QUAN: CPT

## 2023-02-26 PROCEDURE — 25010000002 PROCHLORPERAZINE 10 MG/2ML SOLUTION: Performed by: EMERGENCY MEDICINE

## 2023-02-26 PROCEDURE — 85025 COMPLETE CBC W/AUTO DIFF WBC: CPT | Performed by: EMERGENCY MEDICINE

## 2023-02-26 PROCEDURE — 70450 CT HEAD/BRAIN W/O DYE: CPT

## 2023-02-26 PROCEDURE — 82962 GLUCOSE BLOOD TEST: CPT

## 2023-02-26 PROCEDURE — 36600 WITHDRAWAL OF ARTERIAL BLOOD: CPT

## 2023-02-26 PROCEDURE — 96375 TX/PRO/DX INJ NEW DRUG ADDON: CPT

## 2023-02-26 PROCEDURE — 82805 BLOOD GASES W/O2 SATURATION: CPT

## 2023-02-26 PROCEDURE — 81003 URINALYSIS AUTO W/O SCOPE: CPT | Performed by: EMERGENCY MEDICINE

## 2023-02-26 PROCEDURE — 96374 THER/PROPH/DIAG INJ IV PUSH: CPT

## 2023-02-26 RX ORDER — PROCHLORPERAZINE EDISYLATE 5 MG/ML
5 INJECTION INTRAMUSCULAR; INTRAVENOUS ONCE
Status: COMPLETED | OUTPATIENT
Start: 2023-02-26 | End: 2023-02-26

## 2023-02-26 RX ORDER — DIPHENHYDRAMINE HYDROCHLORIDE 50 MG/ML
25 INJECTION INTRAMUSCULAR; INTRAVENOUS ONCE
Status: COMPLETED | OUTPATIENT
Start: 2023-02-26 | End: 2023-02-26

## 2023-02-26 RX ORDER — SODIUM CHLORIDE 0.9 % (FLUSH) 0.9 %
10 SYRINGE (ML) INJECTION AS NEEDED
Status: DISCONTINUED | OUTPATIENT
Start: 2023-02-26 | End: 2023-02-26 | Stop reason: HOSPADM

## 2023-02-26 RX ADMIN — DIPHENHYDRAMINE HYDROCHLORIDE 25 MG: 50 INJECTION, SOLUTION INTRAMUSCULAR; INTRAVENOUS at 01:11

## 2023-02-26 RX ADMIN — SODIUM CHLORIDE 1000 ML: 9 INJECTION, SOLUTION INTRAVENOUS at 00:42

## 2023-02-26 RX ADMIN — PROCHLORPERAZINE EDISYLATE 5 MG: 5 INJECTION INTRAMUSCULAR; INTRAVENOUS at 00:44

## 2023-02-26 RX ADMIN — DIPHENHYDRAMINE HYDROCHLORIDE 25 MG: 50 INJECTION, SOLUTION INTRAMUSCULAR; INTRAVENOUS at 00:42

## 2023-02-28 ENCOUNTER — TRANSCRIBE ORDERS (OUTPATIENT)
Dept: DIABETES SERVICES | Facility: HOSPITAL | Age: 23
End: 2023-02-28
Payer: COMMERCIAL

## 2023-02-28 DIAGNOSIS — E10.9 TYPE 1 DIABETES MELLITUS WITHOUT COMPLICATION: Primary | ICD-10-CM
